# Patient Record
Sex: FEMALE | Race: WHITE | NOT HISPANIC OR LATINO | Employment: OTHER | URBAN - METROPOLITAN AREA
[De-identification: names, ages, dates, MRNs, and addresses within clinical notes are randomized per-mention and may not be internally consistent; named-entity substitution may affect disease eponyms.]

---

## 2017-11-24 ENCOUNTER — GENERIC CONVERSION - ENCOUNTER (OUTPATIENT)
Dept: FAMILY MEDICINE CLINIC | Facility: CLINIC | Age: 67
End: 2017-11-24

## 2017-12-07 ENCOUNTER — ALLSCRIPTS OFFICE VISIT (OUTPATIENT)
Dept: OTHER | Facility: OTHER | Age: 67
End: 2017-12-07

## 2017-12-07 ENCOUNTER — GENERIC CONVERSION - ENCOUNTER (OUTPATIENT)
Dept: OTHER | Facility: OTHER | Age: 67
End: 2017-12-07

## 2017-12-07 DIAGNOSIS — M85.851 OTHER SPECIFIED DISORDERS OF BONE DENSITY AND STRUCTURE, RIGHT THIGH: ICD-10-CM

## 2017-12-07 DIAGNOSIS — D62 ACUTE POSTHEMORRHAGIC ANEMIA: ICD-10-CM

## 2017-12-07 DIAGNOSIS — M85.852 OTHER SPECIFIED DISORDERS OF BONE DENSITY AND STRUCTURE, LEFT THIGH: ICD-10-CM

## 2017-12-08 ENCOUNTER — GENERIC CONVERSION - ENCOUNTER (OUTPATIENT)
Dept: OTHER | Facility: OTHER | Age: 67
End: 2017-12-08

## 2017-12-08 LAB
A/G RATIO (HISTORICAL): 1.8 (ref 1.2–2.2)
ALBUMIN SERPL BCP-MCNC: 4.1 G/DL (ref 3.6–4.8)
ALP SERPL-CCNC: 89 IU/L (ref 39–117)
ALT SERPL W P-5'-P-CCNC: 16 IU/L (ref 0–32)
AST SERPL W P-5'-P-CCNC: 20 IU/L (ref 0–40)
BASOPHILS # BLD AUTO: 0 X10E3/UL (ref 0–0.2)
BASOPHILS # BLD AUTO: 1 %
BILIRUB SERPL-MCNC: 0.2 MG/DL (ref 0–1.2)
BUN SERPL-MCNC: 15 MG/DL (ref 8–27)
BUN/CREA RATIO (HISTORICAL): 31 (ref 12–28)
CALCIUM SERPL-MCNC: 9.5 MG/DL (ref 8.7–10.3)
CHLORIDE SERPL-SCNC: 101 MMOL/L (ref 96–106)
CO2 SERPL-SCNC: 22 MMOL/L (ref 18–29)
CREAT SERPL-MCNC: 0.48 MG/DL (ref 0.57–1)
DEPRECATED RDW RBC AUTO: 14 % (ref 12.3–15.4)
EGFR AFRICAN AMERICAN (HISTORICAL): 116 ML/MIN/1.73
EGFR-AMERICAN CALC (HISTORICAL): 100 ML/MIN/1.73
EOSINOPHIL # BLD AUTO: 0.2 X10E3/UL (ref 0–0.4)
EOSINOPHIL # BLD AUTO: 5 %
GLUCOSE SERPL-MCNC: 87 MG/DL (ref 65–99)
HCT VFR BLD AUTO: 36.4 % (ref 34–46.6)
HGB BLD-MCNC: 12.2 G/DL (ref 11.1–15.9)
IMM.GRANULOCYTES (CD4/8) (HISTORICAL): 0 %
IMM.GRANULOCYTES (CD4/8) (HISTORICAL): 0 X10E3/UL (ref 0–0.1)
LYMPHOCYTES # BLD AUTO: 1.3 X10E3/UL (ref 0.7–3.1)
LYMPHOCYTES # BLD AUTO: 32 %
MCH RBC QN AUTO: 32.1 PG (ref 26.6–33)
MCHC RBC AUTO-ENTMCNC: 33.5 G/DL (ref 31.5–35.7)
MCV RBC AUTO: 96 FL (ref 79–97)
MONOCYTES # BLD AUTO: 0.4 X10E3/UL (ref 0.1–0.9)
MONOCYTES (HISTORICAL): 9 %
NEUTROPHILS # BLD AUTO: 2.1 X10E3/UL (ref 1.4–7)
NEUTROPHILS # BLD AUTO: 53 %
PLATELET # BLD AUTO: 280 X10E3/UL (ref 150–379)
POTASSIUM SERPL-SCNC: 4.4 MMOL/L (ref 3.5–5.2)
RBC (HISTORICAL): 3.8 X10E6/UL (ref 3.77–5.28)
SODIUM SERPL-SCNC: 140 MMOL/L (ref 134–144)
TOT. GLOBULIN, SERUM (HISTORICAL): 2.3 G/DL (ref 1.5–4.5)
TOTAL PROTEIN (HISTORICAL): 6.4 G/DL (ref 6–8.5)
WBC # BLD AUTO: 3.9 X10E3/UL (ref 3.4–10.8)

## 2018-01-23 NOTE — MISCELLANEOUS
Assessment    1  Intertrochanteric fracture of left hip (820 21) (S72 142A)   2  Breast cancer (174 9) (C50 629)   3  Osteopenia of both hips (733 90) (M85 851,M85 852)   4  Postoperative anemia due to acute blood loss (285 1) (D62)    Plan  Need for pneumococcal vaccine    · Prevnar 13 Intramuscular Suspension   For: Need for pneumococcal vaccine; Ordered By:Luan Vazquez; Effective Date:07Dec2017; Administered by: Susan Marie: 12/7/2017 12:53:00 PM; Last Updated By: Susan Marie; 12/7/2017 12:54:11 PM  Osteopenia of both hips    · Ibandronate Sodium 150 MG Oral Tablet (Boniva); TAKE ONE TABLET BY MOUTH  MONTHLY- take with 8 ounces water- do not eat for one hour   Rx By: Asya Zuniga; Dispense: 0 Days ; #:3 Tablet; Refill: 3; For: Osteopenia of both hips; MAGALY = N; Verified Transmission to [facility]; Last Updated By: System, [employer]; 12/7/2017 12:41:50 PM   · * DXA BONE DENSITY SPINE HIP AND PELVIS; Status:Hold For - Scheduling; Requested  for:07Dec2017;    Perform:[facility]; RTD:[id number]; Ordered;  For:Osteopenia of both hips; Ordered By:Luan Vazquez;  Postoperative anemia due to acute blood loss    · (1) CBC/PLT/DIFF; Status:Active; Requested for:07Dec2017;    Perform:[facility]; UBM:[id number]; Ordered; For:Postoperative anemia due to acute blood loss; Ordered By:Tyrel Vazquez;   · (1) COMPREHENSIVE METABOLIC PANEL; Status:Active; Requested for:07Dec2017;    Perform:[facility]; NAH:[id number]; Ordered; For:Postoperative anemia due to acute blood loss; Ordered By:Luan Vazquez;    Discussion/Summary  Discussion Summary:   In summary then this is a 64-year-old female here for transition of care visit  We dressed multiple issues today  She appears to be healing well from her ORIF with intramedullary velasquez for left intertrochanteric hip fracture  She has no fever, chest pain, shortness of breath, diarrhea, dysuria or other concerning symptom   She continues to follow home with PT OT and visiting nurse  She continues to take aspirin  She does have osteopenia and now is high risk for future fracture based on this fracture  We suggested starting Boniva getting a DEXA scan since she has not had 1 for nearly 2 years  Will also get CBC today as well as CMP due to her history of anemia and her recent hospitalization  She received a Prevnar today and she has received her influenza vaccine earlier this season  Will follow up with her when results of her blood work and DEXA scan are available  She agrees  We had a long discussion with her and her  and all questions were answered  Chief Complaint  Chief Complaint Free Text Note Form: Home OT/PT and VN  Had letrozole for her breast ca  Has ortho re eval 1/5/18  History of Present Illness  TCM Communication [facility]: The patient is being contacted for follow-up after hospitalization  Hospital records were reviewed  She was hospitalized at [facility]  The date of admission: 11/16/17, date of discharge: 11/24/17  Diagnosis: Fracture, intertrochanteric, left femur ORIF with intramedullary velasquez on 11/12  Significant postoperative anemia  She was discharged to home, with home health services  Medications reviewed and updated today  She scheduled a follow up appointment  Symptoms: no fever, no dizziness, no headache, no fatigue, no cough, no shortness of breath, no chest pain, no rash:, no anorexia, no nausea, no vomiting, no loose stools, no incisional pain and no wound drainage  Counseling was provided to the patient  Topics counseled included diagnostic results, instructions for management, risk factor reductions, prognosis, patient and family education, home health agency benefits, activities of daily living and importance of compliance with treatment     Communication performed and completed by Carlos Galindo   HPI: 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 who tripped over cord well leaf blowing on 11/11/2017  She suffered a left hip injury  She was hospitalized at [facility]  On 11/12 she underwent ORIF with intramedullary velasquez for a left intertrochanteric femur fracture  She was noted as significant blood loss and acute postoperative anemia  She also had a syncopal event while ambulating to the bathroom where she was unconscious for  2 minutes  She was resuscitated with fluids and responded  Vitamin-D was initiated  Was noted that she had had a history of osteopenia managed by Hematology/Oncology with a DEXA scan 2/16  She was transferred to [facility] on 11/16 and discharged to home on 11/24  She was discharged with home PT OT and visiting nursing  She is currently ambulating with a walker  She was treated with Lovenox and has been transition to aspirin  She has been using Percocet for pain management which has been significantly reduced  She is taking 2000 units of vitamin-D daily  Preop hemoglobin was 13 7 postoperatively she was 9 7  She has been taking 1 for sulfate tablet twice a day she is also taking Senokot  She has follow-up scheduled next week with      Active Problems    1  Allergic rhinitis (477 9) (J30 9)   2  Allergic rhinitis due to pollen (477 0) (J30 1)   3  Breast cancer (174 9) (C50 919)   4  Compression of sciatic nerve, 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)   5  Flu vaccine need (V04 81) (Z23)   6  Local reaction to insect sting, accidental or unintentional, initial encounter (989 5,E905 9)   (T63 481A)   7  Lymphedema (457 1) (I89 0)   8  Need for immunization against influenza (V04 81) (Z23)   9  Other fatigue (780 79) (R53 83)    Past Medical History    1  History of Acute Cystitis (595 0)    Social History    · Never A Smoker   · Smoker, current status unknown (305 1) (F17 200)    Current Meds   1  Acetaminophen 500 MG Oral Tablet; TAKE 1 TABLET EVERY 4 TO 6 HOURS AS   NEEDED;    Therapy: [id number] to (Evaluate:06Jan2018) Recorded   2  Aleve TABS; Therapy: (Recorded:92Nhq9376) to Recorded   3  Antivert 25 MG TABS; TAKE 1 TABLET 3 TIMES DAILY AS NEEDED; Therapy: (Recorded:25Nov2015) to Recorded   4  Aspirin 81 MG TABS; Therapy: (Recorded:39Fcc2524) to Recorded   5  Cetirizine HCl - 10 MG Oral Tablet; TAKE 1 TABLET DAILY AS NEEDED; Therapy: 57LGH7798 to (Evaluate:17Mar2016); Last Rx:10Mar2016 Ordered   6  Dicloxacillin Sodium 250 MG Oral Capsule; TAKE 1 CAPSULE Every 6 hours; Therapy: 56JIH2141 to (Evaluate:25Dec2016)  Requested for: 47LGZ7628; Last   Rx:15Dec2016 Ordered   7  Ferrous Sulfate 325 MG TBCR; Therapy: (Recorded:01Ixj4955) to Recorded   8  Loratadine 10 MG Oral Tablet; TAKE 1 TABLET DAILY; Therapy: 92YAF9546 to (Last Rx:05Oct2015) Ordered   9  Lovenox SOLN;   Therapy: (Recorded:34Yon2205) to Recorded   10  MiraLax POWD;    Therapy: (Recorded:07Rsx8077) to Recorded   11  OxyCODONE HCl - 10 MG Oral Tablet; Therapy: (Recorded:39Fkn0915) to Recorded   12  RA Tri-Buffered Aspirin 325 MG Oral Tablet; TAKE 1 TABLET DAILY; Therapy: 38TSZ0273 to (Evaluate:04Nov2015); Last Rx:05Oct2015 Ordered   13  Senna-Docusate Sodium 8 6-50 MG Oral Tablet; Therapy: 93GVO7758 to Recorded   14  Senokot TABS; Therapy: (Recorded:58Ros9041) to Recorded   15  Vitamin D3 1000 UNIT Oral Tablet; TAKE AS DIRECTED; Therapy: 22HVV0393 to Recorded   16  Vitamin D-3 CAPS; Therapy: (Recorded:49Nuk0304) to Recorded   17  Zanaflex 2 MG TABS; Therapy: (Recorded:19Ayd2388) to Recorded    Vitals  Signs   Recorded: 07Dec2017 11:41AM   Temperature: 64 8 F  Systolic: 298  Diastolic: 68  Weight: 028 lb   BMI Calculated: 26 39  BSA Calculated: 1 86    Physical Exam    Constitutional   General appearance: Abnormal   Mildly overweight and in no apparent distress  Eyes   Conjunctiva and lids: No swelling, erythema or discharge  Conjunctivae is mildly pale     Pulmonary   Respiratory effort: No increased work of breathing or signs of respiratory distress  Auscultation of lungs: Clear to auscultation  Lungs are clear throughout  Cardiovascular   Auscultation of heart: Normal rate and rhythm, normal S1 and S2, without murmurs  Cardiac rhythm is regular with a rate in the 70s and without murmur gallop  Examination of extremities for edema and/or varicosities: Normal   She has no edema, Ryan sign cord erythema X cetera  Carotid pulses: Normal   Normal carotid upstroke  Lymphatic   Palpation of lymph nodes in neck: No lymphadenopathy  No JVD or adenopathy  Musculoskeletal   Gait and station: Abnormal   Ambulating with a walker  Digits and nails: Normal without clubbing or cyanosis  No clubbing or cyanosis     Psychiatric   Orientation to person, place, and time: Normal     Mood and affect: Normal          Signatures   Electronically signed by : KRIS Plaza ; Dec  7 2017  1:52PM EST                       (Author)

## 2018-01-23 NOTE — RESULT NOTES
Verified Results  (1) COMPREHENSIVE METABOLIC PANEL [id number] 28:05MO Angela Carreno     Test Name Result Flag Reference   Glucose, Serum 87 mg/dL  65-99   BUN 15 mg/dL  8-27   Creatinine, Serum 0 48 mg/dL L 0 57-1 00   BUN/Creatinine Ratio 31 H 12-28   Sodium, Serum 140 mmol/L  134-144   Potassium, Serum 4 4 mmol/L  3 5-5 2   Chloride, Serum 101 mmol/L     Carbon Dioxide, Total 22 mmol/L  18-29   Calcium, Serum 9 5 mg/dL  8 7-10 3   Protein, Total, Serum 6 4 g/dL  6 0-8 5   Albumin, Serum 4 1 g/dL  3 6-4 8   Globulin, Total 2 3 g/dL  1 5-4 5   A/G Ratio 1 8  1 2-2 2   Bilirubin, Total 0 2 mg/dL  0 0-1 2   Alkaline Phosphatase, S 89 IU/L     AST (SGOT) 20 IU/L  0-40   ALT (SGPT) 16 IU/L  0-32   eGFR If NonAfricn Am 100 mL/min/1 73  >59   eGFR If Africn Am 116 mL/min/1 73  >59

## 2018-01-23 NOTE — RESULT NOTES
Verified Results  (1) CBC/PLT/DIFF [id number] 12:00AM Rickey Bonilla     Test Name Result Flag Reference   WBC 3 9 x10E3/uL  3 4-10 8   RBC 3 80 x10E6/uL  3 77-5 28   Hemoglobin 12 2 g/dL  11 1-15 9   **Please note reference interval change**   Hematocrit 36 4 %  34 0-46  6   MCV 96 fL  79-97   MCH 32 1 pg  26 6-33 0   MCHC 33 5 g/dL  31 5-35 7   RDW 14 0 %  12 3-15 4   Platelets 717 L66F5/AA  150-379   Neutrophils 53 %  Not Estab  Lymphs 32 %  Not Estab  Monocytes 9 %  Not Estab  Eos 5 %  Not Estab  Basos 1 %  Not Estab  Neutrophils (Absolute) 2 1 x10E3/uL  1 4-7 0   Lymphs (Absolute) 1 3 x10E3/uL  0 7-3 1   Monocytes(Absolute) 0 4 x10E3/uL  0 1-0 9   Eos (Absolute) 0 2 x10E3/uL  0 0-0 4   Baso (Absolute) 0 0 x10E3/uL  0 0-0 2   Immature Granulocytes 0 %  Not Estab     Immature Grans (Abs) 0 0 x10E3/uL  0 0-0 1

## 2018-01-24 VITALS
WEIGHT: 166 LBS | TEMPERATURE: 99.1 F | BODY MASS INDEX: 26.39 KG/M2 | SYSTOLIC BLOOD PRESSURE: 110 MMHG | DIASTOLIC BLOOD PRESSURE: 68 MMHG

## 2018-05-04 RX ORDER — LETROZOLE 2.5 MG/1
1 TABLET, FILM COATED ORAL DAILY
COMMUNITY
Start: 2015-10-05 | End: 2018-05-07

## 2018-05-04 RX ORDER — MECLIZINE HYDROCHLORIDE 25 MG/1
1 TABLET ORAL 3 TIMES DAILY PRN
COMMUNITY

## 2018-05-04 RX ORDER — TIZANIDINE 2 MG/1
TABLET ORAL
COMMUNITY
End: 2018-05-07

## 2018-05-04 RX ORDER — SENNA PLUS 8.6 MG/1
TABLET ORAL
COMMUNITY
End: 2018-05-07

## 2018-05-04 RX ORDER — IBANDRONATE SODIUM 150 MG/1
TABLET, FILM COATED ORAL
COMMUNITY
Start: 2017-12-07 | End: 2018-05-07 | Stop reason: CLARIF

## 2018-05-04 RX ORDER — BIOTIN 1 MG
TABLET ORAL
COMMUNITY
End: 2018-05-07

## 2018-05-04 RX ORDER — DICLOXACILLIN SODIUM 250 MG/1
1 CAPSULE ORAL EVERY 6 HOURS
COMMUNITY
Start: 2016-12-15 | End: 2018-05-07 | Stop reason: SDUPTHER

## 2018-05-04 RX ORDER — CETIRIZINE HYDROCHLORIDE 10 MG/1
1 TABLET ORAL DAILY PRN
COMMUNITY
Start: 2016-03-10 | End: 2018-05-07

## 2018-05-04 RX ORDER — FERROUS SULFATE 325(65) MG
TABLET ORAL
COMMUNITY
End: 2018-05-07

## 2018-05-04 RX ORDER — LORATADINE 10 MG/1
1 TABLET ORAL DAILY
COMMUNITY
Start: 2015-10-05 | End: 2018-05-07

## 2018-05-07 ENCOUNTER — OFFICE VISIT (OUTPATIENT)
Dept: FAMILY MEDICINE CLINIC | Facility: CLINIC | Age: 68
End: 2018-05-07
Payer: MEDICARE

## 2018-05-07 VITALS
BODY MASS INDEX: 27.97 KG/M2 | WEIGHT: 174 LBS | DIASTOLIC BLOOD PRESSURE: 82 MMHG | HEIGHT: 66 IN | SYSTOLIC BLOOD PRESSURE: 152 MMHG

## 2018-05-07 DIAGNOSIS — I89.0 LYMPHEDEMA: Primary | ICD-10-CM

## 2018-05-07 DIAGNOSIS — M85.852 OSTEOPENIA OF BOTH HIPS: Primary | ICD-10-CM

## 2018-05-07 DIAGNOSIS — M85.851 OSTEOPENIA OF BOTH HIPS: Primary | ICD-10-CM

## 2018-05-07 DIAGNOSIS — S72.142D CLOSED DISPLACED INTERTROCHANTERIC FRACTURE OF LEFT FEMUR WITH ROUTINE HEALING, SUBSEQUENT ENCOUNTER: ICD-10-CM

## 2018-05-07 PROBLEM — E55.9 VITAMIN D INSUFFICIENCY: Status: ACTIVE | Noted: 2017-11-01

## 2018-05-07 PROBLEM — C50.919 BREAST CANCER (HCC): Status: ACTIVE | Noted: 2017-11-09

## 2018-05-07 PROBLEM — S72.142A FRACTURE, INTERTROCHANTERIC, LEFT FEMUR (HCC): Status: ACTIVE | Noted: 2017-11-11

## 2018-05-07 PROBLEM — M17.10 ARTHRITIS OF KNEE: Status: ACTIVE | Noted: 2018-05-07

## 2018-05-07 PROBLEM — D62 POSTOPERATIVE ANEMIA DUE TO ACUTE BLOOD LOSS: Status: ACTIVE | Noted: 2017-12-07

## 2018-05-07 PROCEDURE — 99214 OFFICE O/P EST MOD 30 MIN: CPT | Performed by: FAMILY MEDICINE

## 2018-05-07 RX ORDER — DICLOXACILLIN SODIUM 250 MG/1
250 CAPSULE ORAL EVERY 6 HOURS
Qty: 40 CAPSULE | Refills: 0 | Status: SHIPPED | OUTPATIENT
Start: 2018-05-07 | End: 2018-05-17

## 2018-05-07 RX ORDER — NAPROXEN SODIUM 220 MG
220 TABLET ORAL AS NEEDED
COMMUNITY

## 2018-05-07 NOTE — PROGRESS NOTES
Assessment/Plan:  Osteopenia of both hips  The patient has low bone mineral density  She also has a history of a fragility fracture  She also has a history of breast carcinoma  She would certainly be a candidate for pharmacotherapy of her osteopenia with history of fracture  Unfortunately she currently needs significant oral surgery to include bone grafting which would preclude her being treated presently with pharmacotherapy  We discussed continued weight-bearing exercise as well as calcium plus vitamin-D  When cleared by her oral surgeon I would suggest beginning pharmacotherapy  We discussed her options  She is not in favor of injectable such as Prolia  We also reviewed that recent literature shows the [facility] may also not be the best option for her  We will most likely choose Actonel weekly for her  She will call us back when cleared by Oral surgery  We spent 30 minutes with the patient and her  of which 25 minutes were spent in discussion and counseling  Diagnoses and all orders for this visit:    Osteopenia of both hips    Closed displaced intertrochanteric fracture of left femur with routine healing, subsequent encounter    Other orders  -     meclizine (ANTIVERT) 25 mg tablet; Take 1 tablet by mouth 3 (three) times a day as needed  -     aspirin 81 MG tablet; Take by mouth  -     Discontinue: cetirizine (ZyrTEC) 10 mg tablet; Take 1 tablet by mouth daily as needed  -     Discontinue: dicloxacillin (DYNAPEN) 250 mg capsule; Take 1 capsule by mouth every 6 (six) hours  -     Discontinue: ferrous sulfate 325 (65 Fe) mg tablet; Take by mouth  -     Discontinue: ibandronate (BONIVA) 150 MG tablet; Take by mouth  -     Discontinue: letrozole (FEMARA) 2 5 mg tablet; Take 1 tablet by mouth daily  -     Discontinue: loratadine (CLARITIN) 10 mg tablet; Take 1 tablet by mouth daily  -     Discontinue: Cholecalciferol (VITAMIN D3) 1000 units CAPS;  Take by mouth  -     Discontinue: senna (SENOKOT) 8 6 MG tablet; Take by mouth  -     Discontinue: tiZANidine (ZANAFLEX) 2 mg tablet; Take by mouth  -     CALCIUM CITRATE-VITAMIN D3 PO; Take by mouth  -     naproxen sodium (ALEVE) 220 MG tablet; Take 220 mg by mouth every 24 hours          Subjective:   Chief Complaint   Patient presents with   [address]        Patient ID: Brenna Masters is a 77 y o  female  Actonel, Fosamax orProlia  HPI  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 diminished bone mineral density and possible pharmacotherapy  She has a history of hip fracture with ORIF and intramedullary velasquez  She was asked by Orthopedic surgery to avoid taking Boniva until hip had the chance to fully heal which was to be May of this year  In the meantime she has found out that she has significant dental issues and will need oral surgery to include bony grafting and implants  Oral surgeon has told her that she will not be able to take bisphosphonate for several months around the time of this treatment  She is here to discuss options for treatment of her low bone mineral density  She 1st requested that we review her DEXA scan from March 2nd 2018 through [address]  This DEXA scan revealed lumbar spine normal and-0 8  Total right hip -1 9  Right femoral neck-1 6  These findings are consistent with osteopenia  We did discuss that she had a fragility fracture of the hip and she does have a history of breast cancer making her high risk for further fracture  Pharmacotherapy would be indicated in her case  Current options recommended would include Actonel, Fosamax, Prolia or Reclast   She is compliant with weight-bearing exercise as well as calcium plus vitamin-D            The following portions of the patient's history were reviewed and updated as appropriate: allergies, current medications, past family history, past medical history, past social history, past surgical history and problem list     Review of Systems   Constitution: Negative  Cardiovascular: Negative  Respiratory: Negative  Endocrine:        History of breast cancer   Musculoskeletal: Positive for stiffness  Negative for back pain  History of hip fracture with intramedullary velasquez/ORIF       Per history of present illness  Objective:    Physical Exam   Constitutional: She is oriented to person, place, and time  She appears well-developed and well-nourished  No distress  Musculoskeletal:   Ambulates with a cane   Neurological: She is alert and oriented to person, place, and time  Psychiatric: She has a normal mood and affect   Thought content normal    Serous affect

## 2018-05-08 NOTE — ASSESSMENT & PLAN NOTE
The patient has low bone mineral density  She also has a history of a fragility fracture  She also has a history of breast carcinoma  She would certainly be a candidate for pharmacotherapy of her osteopenia with history of fracture  Unfortunately she currently needs significant oral surgery to include bone grafting which would preclude her being treated presently with pharmacotherapy  We discussed continued weight-bearing exercise as well as calcium plus vitamin-D  When cleared by her oral surgeon I would suggest beginning pharmacotherapy  We discussed her options  She is not in favor of injectable such as Prolia  We also reviewed that recent literature shows the [facility] may also not be the best option for her  We will most likely choose Actonel weekly for her  She will call us back when cleared by Oral surgery

## 2018-05-08 NOTE — PATIENT INSTRUCTIONS
Please call when you cleared by Oral surgery for treatment of your low bone mineral density  Call sooner as needed

## 2018-10-08 ENCOUNTER — OFFICE VISIT (OUTPATIENT)
Dept: FAMILY MEDICINE CLINIC | Facility: CLINIC | Age: 68
End: 2018-10-08

## 2018-10-08 VITALS
HEIGHT: 66 IN | HEART RATE: 75 BPM | WEIGHT: 173 LBS | BODY MASS INDEX: 27.8 KG/M2 | SYSTOLIC BLOOD PRESSURE: 124 MMHG | OXYGEN SATURATION: 96 % | DIASTOLIC BLOOD PRESSURE: 80 MMHG | TEMPERATURE: 98.3 F

## 2018-10-08 DIAGNOSIS — F41.9 ACUTE ANXIETY: Primary | ICD-10-CM

## 2018-10-08 DIAGNOSIS — Z23 NEED FOR INFLUENZA VACCINATION: ICD-10-CM

## 2018-10-08 PROCEDURE — 99213 OFFICE O/P EST LOW 20 MIN: CPT | Performed by: FAMILY MEDICINE

## 2018-10-08 PROCEDURE — 90662 IIV NO PRSV INCREASED AG IM: CPT

## 2018-10-08 PROCEDURE — G0008 ADMIN INFLUENZA VIRUS VAC: HCPCS

## 2018-10-08 RX ORDER — FAMOTIDINE 20 MG/1
20 TABLET, FILM COATED ORAL DAILY
COMMUNITY
End: 2019-01-11

## 2018-10-08 RX ORDER — ALPRAZOLAM 0.25 MG/1
0.25 TABLET ORAL
Qty: 30 TABLET | Refills: 1 | Status: SHIPPED | OUTPATIENT
Start: 2018-10-08 | End: 2018-11-28 | Stop reason: SDUPTHER

## 2018-10-08 RX ORDER — ERGOCALCIFEROL (VITAMIN D2) 50 MCG
CAPSULE ORAL DAILY
COMMUNITY

## 2018-10-08 NOTE — ASSESSMENT & PLAN NOTE
Patient is a 75-year-old female who presents today with an acute anxiety disorder related to her 's recent diagnosis of metastatic prostate carcinoma  She has no suicidal ideation  We are going to give her Xanax 0 25 number 30 to be used as needed  We discussed this at length  We also gave her warnings as to sedation, ataxia, driving operating machinery X cetera  She is in agreement with the above  She will be accompanying her  to his visits here and we will discuss this as needed at that time  She will call sooner as needed  She agrees

## 2018-10-08 NOTE — PROGRESS NOTES
Assessment/Plan:  Acute anxiety  Patient is a 47-year-old female who presents today with an acute anxiety disorder related to her 's recent diagnosis of metastatic prostate carcinoma  She has no suicidal ideation  We are going to give her Xanax 0 25 number 30 to be used as needed  We discussed this at length  We also gave her warnings as to sedation, ataxia, driving operating machinery X cetera  She is in agreement with the above  She will be accompanying her  to his visits here and we will discuss this as needed at that time  She will call sooner as needed  She agrees  She is going to return for fasting blood work to include CBC CMP and TSH  Diagnoses and all orders for this visit:    Acute anxiety  -     ALPRAZolam (XANAX) 0 25 mg tablet; Take 1 tablet (0 25 mg total) by mouth daily at bedtime as needed for anxiety    Need for influenza vaccination  -     influenza vaccine, 2808-9917, high-dose, PF 0 5 mL, for patients 65 yr+ (FLUZONE HIGH-DOSE)    Other orders  -     Vitamin D, Ergocalciferol, 2000 units CAPS; Take by mouth daily  -     famotidine (PEPCID) 20 mg tablet; Take 20 mg by mouth daily          Subjective:   Chief Complaint   Patient presents with    Anxiety     pt here for discuss her anxiety today  she will get a flu shot  last colon was 11/12  she will schedule for fbw  Patient ID: Leena Leung is a 77 y o  female  I have stomach aches from my anxiety  Excessive worry, insomnia  Pepcid somewhat helpful  Similar feelings in 25s during marital stress in 25s  No suicidal ideation  HPI  The patient is a 47-year-old female who presents today with anxiety and related somatic symptom  Her  was recently diagnosed with metastatic prostate carcinoma and she has acute anxiety related to this  She has had issues with insomnia and excessive worry  She has had some GI complaint with epigastric discomfort and acid indigestion    She has had no dysphagia no change in her bowels  She has had no suicidal ideation  She did have a similar episode in her 25s do to marital dysfunction but has had no recurrence  The following portions of the patient's history were reviewed and updated as appropriate: allergies, current medications, past family history, past medical history, past social history, past surgical history and problem list     ROS    Limited review of systems as per HPI  Objective:    Physical Exam   Constitutional: She is oriented to person, place, and time  She appears well-developed and well-nourished  No distress  Neck: Neck supple  No JVD present  No thyromegaly present  Cardiovascular: Normal rate and regular rhythm  Pulmonary/Chest: Effort normal and breath sounds normal    Neurological: She is alert and oriented to person, place, and time  Psychiatric: She has a normal mood and affect     Anxious appearance

## 2018-11-28 DIAGNOSIS — F41.9 ACUTE ANXIETY: ICD-10-CM

## 2018-11-29 RX ORDER — ALPRAZOLAM 0.25 MG/1
0.25 TABLET ORAL 2 TIMES DAILY PRN
Qty: 60 TABLET | Refills: 0 | OUTPATIENT
Start: 2018-11-29 | End: 2019-01-03 | Stop reason: SDUPTHER

## 2019-01-03 DIAGNOSIS — F41.9 ACUTE ANXIETY: ICD-10-CM

## 2019-01-03 RX ORDER — ALPRAZOLAM 0.25 MG/1
0.25 TABLET ORAL 2 TIMES DAILY PRN
Qty: 60 TABLET | Refills: 0 | OUTPATIENT
Start: 2019-01-03 | End: 2019-02-08 | Stop reason: SDUPTHER

## 2019-01-11 ENCOUNTER — OFFICE VISIT (OUTPATIENT)
Dept: FAMILY MEDICINE CLINIC | Facility: CLINIC | Age: 69
End: 2019-01-11
Payer: MEDICARE

## 2019-01-11 VITALS
SYSTOLIC BLOOD PRESSURE: 142 MMHG | HEIGHT: 66 IN | BODY MASS INDEX: 27.32 KG/M2 | DIASTOLIC BLOOD PRESSURE: 80 MMHG | WEIGHT: 170 LBS

## 2019-01-11 DIAGNOSIS — F43.21 ADJUSTMENT DISORDER WITH DEPRESSED MOOD: Primary | ICD-10-CM

## 2019-01-11 PROCEDURE — 99214 OFFICE O/P EST MOD 30 MIN: CPT | Performed by: FAMILY MEDICINE

## 2019-01-11 RX ORDER — FLUOXETINE 10 MG/1
10 TABLET, FILM COATED ORAL DAILY
Qty: 30 TABLET | Refills: 0 | Status: SHIPPED | OUTPATIENT
Start: 2019-01-11 | End: 2019-02-08 | Stop reason: SDUPTHER

## 2019-01-11 RX ORDER — NAPROXEN SODIUM 220 MG
220 TABLET ORAL
COMMUNITY
End: 2019-01-11

## 2019-01-11 RX ORDER — OXYCODONE HYDROCHLORIDE 10 MG/1
10-15 TABLET ORAL EVERY 4 HOURS PRN
COMMUNITY
Start: 2017-11-22 | End: 2019-01-11

## 2019-01-11 RX ORDER — ACETAMINOPHEN 500 MG
1000 TABLET ORAL EVERY 8 HOURS PRN
COMMUNITY
Start: 2017-11-22 | End: 2019-01-11

## 2019-01-11 NOTE — ASSESSMENT & PLAN NOTE
The patient is suffering from adjustment disorder related to her 's illness with exacerbation of her underlying anxiety depression  We are going to start her on fluoxetine 10 mg daily  We had a long discussion of this  We told her that she may not see improvement in her condition for 3 days to 3 weeks  She may experience nausea lightheadedness, sleep disturbance or even suicidal thinking  She is going to let us know should she have any significant side effect  Will see her back in 3 weeks or she will call sooner as needed  She will continue to use alprazolam as needed  She does have an appointment to see a psychiatrist in March for evaluation

## 2019-01-11 NOTE — PROGRESS NOTES
Assessment/Plan:  Adjustment disorder with depressed mood  The patient is suffering from adjustment disorder related to her 's illness with exacerbation of her underlying anxiety depression  We are going to start her on fluoxetine 10 mg daily  We had a long discussion of this  We told her that she may not see improvement in her condition for 3 days to 3 weeks  She may experience nausea lightheadedness, sleep disturbance or even suicidal thinking  She is going to let us know should she have any significant side effect  Will see her back in 3 weeks or she will call sooner as needed  She will continue to use alprazolam as needed  She does have an appointment to see a psychiatrist in March for evaluation  Diagnoses and all orders for this visit:    Adjustment disorder with depressed mood  -     FLUoxetine (PROzac) 10 MG tablet; Take 1 tablet (10 mg total) by mouth daily    Other orders  -     Discontinue: acetaminophen (TYLENOL) 500 mg tablet; Take 1,000 mg by mouth every 8 (eight) hours as needed  -     Discontinue: oxyCODONE (ROXICODONE) 10 MG TABS; Take 10-15 mg by mouth every 4 (four) hours as needed  -     Discontinue: naproxen sodium (ALEVE) 220 MG tablet; Take 220 mg by mouth          Subjective:   Chief Complaint   Patient presents with    Anxiety     Weepiness and loss of interest     Patient ID: Donald Bernard is a 77 y o  female  HPI  Patient is a 33-year-old female who presents today for follow-up of anxiety  She feels that she also suffers from depression which she feels has been an issue for many years  Many of her family members take medications for anxiety and depression  She felt like she can handle it on her own until her 's recent diagnosis of prostate cancer, chemotherapy X cetera  She states that she has found herself weeping  She has loss of interest and energy  She has feelings of guilt and continues with insomnia  She has difficulty with concentration  She is not suicidal   Her daughter takes sertraline  The following portions of the patient's history were reviewed and updated as appropriate: allergies, current medications, past family history, past medical history, past social history, past surgical history and problem list     Review of Systems   Constitution: Negative for decreased appetite  Cardiovascular: Negative for chest pain  Respiratory: Negative for shortness of breath  Neurological: Negative for headaches  Psychiatric/Behavioral: Positive for depression  Negative for hallucinations, hypervigilance, memory loss, substance abuse and suicidal ideas  The patient has insomnia and is nervous/anxious  Objective:    Physical Exam   Constitutional: She is oriented to person, place, and time  She appears well-developed and well-nourished  Neck: No thyromegaly present  Cardiovascular: Normal rate and regular rhythm  Pulmonary/Chest: Effort normal and breath sounds normal    Neurological: She is alert and oriented to person, place, and time  Psychiatric: Thought content normal    Depressed affect   Nursing note and vitals reviewed

## 2019-02-08 ENCOUNTER — OFFICE VISIT (OUTPATIENT)
Dept: FAMILY MEDICINE CLINIC | Facility: CLINIC | Age: 69
End: 2019-02-08
Payer: COMMERCIAL

## 2019-02-08 VITALS
TEMPERATURE: 98.3 F | WEIGHT: 169 LBS | HEART RATE: 69 BPM | OXYGEN SATURATION: 98 % | BODY MASS INDEX: 27.16 KG/M2 | SYSTOLIC BLOOD PRESSURE: 130 MMHG | DIASTOLIC BLOOD PRESSURE: 80 MMHG | HEIGHT: 66 IN

## 2019-02-08 DIAGNOSIS — Z13.220 SCREENING, LIPID: ICD-10-CM

## 2019-02-08 DIAGNOSIS — F41.9 ACUTE ANXIETY: ICD-10-CM

## 2019-02-08 DIAGNOSIS — M85.852 OSTEOPENIA OF BOTH HIPS: ICD-10-CM

## 2019-02-08 DIAGNOSIS — M85.851 OSTEOPENIA OF BOTH HIPS: ICD-10-CM

## 2019-02-08 DIAGNOSIS — Z11.59 NEED FOR HEPATITIS C SCREENING TEST: Primary | ICD-10-CM

## 2019-02-08 DIAGNOSIS — F43.21 ADJUSTMENT DISORDER WITH DEPRESSED MOOD: ICD-10-CM

## 2019-02-08 DIAGNOSIS — Z13.0 SCREENING FOR IRON DEFICIENCY ANEMIA: ICD-10-CM

## 2019-02-08 DIAGNOSIS — Z13.1 SCREENING FOR DIABETES MELLITUS: ICD-10-CM

## 2019-02-08 PROBLEM — D62 POSTOPERATIVE ANEMIA DUE TO ACUTE BLOOD LOSS: Status: RESOLVED | Noted: 2017-12-07 | Resolved: 2019-02-08

## 2019-02-08 PROCEDURE — 99214 OFFICE O/P EST MOD 30 MIN: CPT | Performed by: FAMILY MEDICINE

## 2019-02-08 PROCEDURE — 1160F RVW MEDS BY RX/DR IN RCRD: CPT | Performed by: FAMILY MEDICINE

## 2019-02-08 PROCEDURE — 3008F BODY MASS INDEX DOCD: CPT | Performed by: FAMILY MEDICINE

## 2019-02-08 PROCEDURE — 1036F TOBACCO NON-USER: CPT | Performed by: FAMILY MEDICINE

## 2019-02-08 RX ORDER — ALPRAZOLAM 0.25 MG/1
0.25 TABLET ORAL 2 TIMES DAILY PRN
Qty: 60 TABLET | Refills: 0 | Status: CANCELLED | OUTPATIENT
Start: 2019-02-08

## 2019-02-08 RX ORDER — FLUOXETINE 20 MG/1
20 TABLET, FILM COATED ORAL DAILY
Qty: 30 TABLET | Refills: 1 | Status: SHIPPED | OUTPATIENT
Start: 2019-02-08 | End: 2020-04-23 | Stop reason: SINTOL

## 2019-02-08 RX ORDER — ALPRAZOLAM 0.25 MG/1
0.25 TABLET ORAL 3 TIMES DAILY PRN
Qty: 90 TABLET | Refills: 1 | Status: SHIPPED | OUTPATIENT
Start: 2019-02-08

## 2019-02-08 NOTE — ASSESSMENT & PLAN NOTE
We are going to increase her dose of alprazolam so that she can take 2 at bedtime to hopefully improve her insomnia  Additionally hopefully increase in fluoxetine will aid with this as well

## 2019-02-08 NOTE — ASSESSMENT & PLAN NOTE
We are going to have her increase her fluoxetine to 20 mg  She will be seeing Psychiatry in 2-3 weeks  Most likely they will be assuming her care  She is asked to call back in the meantime should she develop any suicidal thoughts or progression of her symptoms

## 2019-02-08 NOTE — PROGRESS NOTES
Assessment/Plan:  Adjustment disorder with depressed mood  We are going to have her increase her fluoxetine to 20 mg  She will be seeing Psychiatry in 2-3 weeks  Most likely they will be assuming her care  She is asked to call back in the meantime should she develop any suicidal thoughts or progression of her symptoms  Acute anxiety  We are going to increase her dose of alprazolam so that she can take 2 at bedtime to hopefully improve her insomnia  Additionally hopefully increase in fluoxetine will aid with this as well  Osteopenia of both hips  We are going to discuss institution of bisphosphonate with her in the future  Diagnoses and all orders for this visit:    Need for hepatitis C screening test  -     Hepatitis C antibody; Future    Acute anxiety  -     ALPRAZolam (XANAX) 0 25 mg tablet; Take 1 tablet (0 25 mg total) by mouth 3 (three) times a day as needed for anxiety  -     TSH, 3rd generation with Free T4 reflex; Future  -     TSH, 3rd generation with Free T4 reflex    Screening for iron deficiency anemia  -     CBC; Future    Screening for diabetes mellitus  -     Comprehensive metabolic panel; Future    Screening, lipid  -     Lipid panel; Future    Adjustment disorder with depressed mood  -     FLUoxetine (PROzac) 20 MG tablet; Take 1 tablet (20 mg total) by mouth daily    Osteopenia of both hips    Other orders  -     Cancel: ALPRAZolam (XANAX) 0 25 mg tablet; Take 1 tablet (0 25 mg total) by mouth 2 (two) times a day as needed for anxiety      we are going to follow up with her by phone when results of her blood work results are available  Subjective:   Chief Complaint   Patient presents with    Follow-up     4wk recheck on her meds  pt will go for fbw next week, and i will her the orders today         Patient ID: Lori Jeong is a 77 y o  female      HPI  The patient is a 39-year-old female who presents today for follow-up of anxiety and depressed mood related to adjustment disorder  Her  is being treated with chemotherapy presently for prostate carcinoma  He has been having some emotional difficulty with this  This is adding to her anxiety level  She states that she has taken the Prozac for 4 weeks and does not yet feel great  She states she feels down in the dumps anxious and nervous  She takes 1 Xanax at night to help her sleep and half during the daytime  She frequently awakens after 2 hours and has to take a Benadryl to get back to sleep  Overall she has no side effects from the Xanax for the Prozac  She does continue have vegetative symptoms of depression including difficulty with sleep, diminished interest and energy level  She is not suicidal   She states that she would like to discuss treatment with Fosamax for her osteopenia and history of femur fracture  She would like to wait until she is stabilized on the fluoxetine and alprazolam   She is going to be seeing a psychiatrist in 2 weeks for further evaluation  The following portions of the patient's history were reviewed and updated as appropriate: allergies, current medications, past family history, past medical history, past social history, past surgical history and problem list     Review of Systems   Constitution: Positive for malaise/fatigue  Negative for decreased appetite and weight gain  Respiratory: Negative for cough and shortness of breath  Endocrine: Negative for polydipsia, polyphagia and polyuria  Hematologic/Lymphatic: Negative for adenopathy and bleeding problem  Does not bruise/bleed easily  Musculoskeletal: Negative for myalgias  Neurological: Negative for dizziness and headaches  Psychiatric/Behavioral: Positive for depression  Negative for altered mental status and suicidal ideas  The patient has insomnia and is nervous/anxious  Objective:    Physical Exam   Constitutional: She is oriented to person, place, and time  She appears well-developed and well-nourished  Neck: Neck supple  No JVD present  No thyromegaly present  Cardiovascular: Normal rate, regular rhythm and normal heart sounds  Pulmonary/Chest: Effort normal and breath sounds normal  No respiratory distress  She has no wheezes  She has no rales  Lymphadenopathy:     She has no cervical adenopathy  Neurological: She is alert and oriented to person, place, and time  Skin: No rash noted  No erythema     Psychiatric:   Mildly dysphoric affect

## 2019-02-14 ENCOUNTER — CLINICAL SUPPORT (OUTPATIENT)
Dept: FAMILY MEDICINE CLINIC | Facility: CLINIC | Age: 69
End: 2019-02-14
Payer: COMMERCIAL

## 2019-02-14 DIAGNOSIS — Z11.59 NEED FOR HEPATITIS C SCREENING TEST: Primary | ICD-10-CM

## 2019-02-14 DIAGNOSIS — Z13.0 SCREENING FOR IRON DEFICIENCY ANEMIA: ICD-10-CM

## 2019-02-14 DIAGNOSIS — Z13.220 SCREENING, LIPID: ICD-10-CM

## 2019-02-14 PROCEDURE — 36415 COLL VENOUS BLD VENIPUNCTURE: CPT

## 2019-02-15 LAB
CHOLEST SERPL-MCNC: 186 MG/DL
CHOLEST/HDLC SERPL: 2.1 (CALC)
ERYTHROCYTE [DISTWIDTH] IN BLOOD BY AUTOMATED COUNT: 13 % (ref 11–15)
HCT VFR BLD AUTO: 46.1 % (ref 35–45)
HCV AB S/CO SERPL IA: 0.01
HCV AB SERPL QL IA: NORMAL
HDLC SERPL-MCNC: 87 MG/DL
HGB BLD-MCNC: 15.1 G/DL (ref 11.7–15.5)
LDLC SERPL CALC-MCNC: 86 MG/DL (CALC)
MCH RBC QN AUTO: 32.3 PG (ref 27–33)
MCHC RBC AUTO-ENTMCNC: 32.8 G/DL (ref 32–36)
MCV RBC AUTO: 98.7 FL (ref 80–100)
NONHDLC SERPL-MCNC: 99 MG/DL (CALC)
PLATELET # BLD AUTO: 213 THOUSAND/UL (ref 140–400)
PMV BLD REES-ECKER: 9.7 FL (ref 7.5–12.5)
RBC # BLD AUTO: 4.67 MILLION/UL (ref 3.8–5.1)
TRIGL SERPL-MCNC: 52 MG/DL
WBC # BLD AUTO: 3.5 THOUSAND/UL (ref 3.8–10.8)

## 2019-10-14 ENCOUNTER — IMMUNIZATIONS (OUTPATIENT)
Dept: FAMILY MEDICINE CLINIC | Facility: CLINIC | Age: 69
End: 2019-10-14
Payer: COMMERCIAL

## 2019-10-14 DIAGNOSIS — Z23 ENCOUNTER FOR IMMUNIZATION: ICD-10-CM

## 2019-10-14 PROCEDURE — 90662 IIV NO PRSV INCREASED AG IM: CPT

## 2019-10-14 PROCEDURE — G0008 ADMIN INFLUENZA VIRUS VAC: HCPCS

## 2019-12-13 ENCOUNTER — OFFICE VISIT (OUTPATIENT)
Dept: FAMILY MEDICINE CLINIC | Facility: CLINIC | Age: 69
End: 2019-12-13
Payer: COMMERCIAL

## 2019-12-13 VITALS
HEIGHT: 66 IN | BODY MASS INDEX: 28.45 KG/M2 | WEIGHT: 177 LBS | DIASTOLIC BLOOD PRESSURE: 78 MMHG | SYSTOLIC BLOOD PRESSURE: 138 MMHG

## 2019-12-13 DIAGNOSIS — I44.7 LEFT BUNDLE BRANCH BLOCK: ICD-10-CM

## 2019-12-13 DIAGNOSIS — M17.11 PRIMARY OSTEOARTHRITIS OF RIGHT KNEE: Primary | ICD-10-CM

## 2019-12-13 PROBLEM — M17.12 PRIMARY OSTEOARTHRITIS OF LEFT KNEE: Status: ACTIVE | Noted: 2018-05-07

## 2019-12-13 PROCEDURE — 1036F TOBACCO NON-USER: CPT | Performed by: FAMILY MEDICINE

## 2019-12-13 PROCEDURE — 3008F BODY MASS INDEX DOCD: CPT | Performed by: FAMILY MEDICINE

## 2019-12-13 PROCEDURE — 1160F RVW MEDS BY RX/DR IN RCRD: CPT | Performed by: FAMILY MEDICINE

## 2019-12-13 PROCEDURE — 99214 OFFICE O/P EST MOD 30 MIN: CPT | Performed by: FAMILY MEDICINE

## 2019-12-13 NOTE — PROGRESS NOTES
BMI Counseling: Body mass index is 29 01 kg/m²  The BMI is above normal  Nutrition recommendations include decreasing portion sizes, encouraging healthy choices of fruits and vegetables and moderation in carbohydrate intake  Exercise recommendations include moderate physical activity 150 minutes/week and exercising 3-5 times per week  No pharmacotherapy was ordered  Assessment/Plan:  Primary osteoarthritis of right knee  The patient presents today for preoperative clearance for right total knee replacement by Dr Nadia Keith at [facility] in early January  Preoperative blood work was essentially normal   Preoperative ECG showed a left bundle branch block which has been present for at least 2 years  She is asymptomatic from a cardiopulmonary standpoint  She has no cardiac functional limitation presently  She does not have a cardiac history otherwise  She is scheduled to see [facility] for further evaluation  Due to her unlimited cardiac functional status as well as notation that this left bundle-branch block is not new I believe she is appropriate for surgery at present  She is cleared for the proposed procedure  Left bundle branch block  Previously noted  No history of workup  No present cardiac functional limitation  She is cleared for the proposed procedure  She does have evaluation with [facility] scheduled  Diagnoses and all orders for this visit:    Primary osteoarthritis of right knee    Left bundle branch block    Other orders  -     Ascorbic Acid (VITAMIN C PO); Take 500 mg by mouth  -     Discontinue: Calcium-Magnesium-Vitamin D (CITRACAL CALCIUM+D PO); Take 1 capsule by mouth  -     mupirocin (BACTROBAN) 2 % ointment; Apply topically 2 (two) times a day          Subjective:   Chief Complaint   Patient presents with    Surgical clearance right total knee        Patient ID: Claudy Bill is a 68 y o  female  L BBB  No Cp   No cardiac functional limitation  No VTE by history  No liver disease, lung disease  No anticoagulants other than baby ASA  Xanax HS  D/c'ed fluoxetine  No GI /  sx  R TKR  HPI  The patient is a 66-year-old female who presents today for a preoperative clearance for right total knee replacement  This is to be performed by Dr Kei Lopez at [facility]  She had preoperative blood work performed which essentially is normal   She had preoperative ECG which showed a left bundle branch block  She did consult with anesthesia who told her that this is unchanged from a previous ECG performed a couple of years ago for urgent surgery related to left femur fracture repair  She was unaware of this left bundle branch block  She has no cardiac history  She denies any symptomatology or cardiac functional limitation such as inability to climb 2 flights of stairs or walk several blocks on a flat surface without symptoms  She states that her only limitation is related to the degenerative joint disease in her knees  She has no history of venous thromboembolic disorder  She has no history of bleeding  She is taking baby aspirin  She takes Xanax HS for chronic anxiety  By review she denies any GI or  symptoms  She has no history of liver or lung disease  She has no history of issues with anesthesia  Past surgical history includes inter trochanteric femur fracture on the left with ORIF  Breast cancer surgery 2006  The following portions of the patient's history were reviewed and updated as appropriate: allergies, current medications, past family history, past medical history, past social history, past surgical history and problem list     Review of Systems   Constitution: Negative  Cardiovascular: Negative  Respiratory: Negative  Endocrine: Negative  Musculoskeletal: Positive for arthritis, joint pain, joint swelling and stiffness  Negative for falls and muscle weakness     Gastrointestinal: Negative for constipation and diarrhea  Genitourinary: Negative for bladder incontinence, dysuria and frequency  Neurological: Negative for dizziness and headaches  Psychiatric/Behavioral: Negative for depression and suicidal ideas  The patient has insomnia and is nervous/anxious  Objective:    Physical Exam   Constitutional: She is oriented to person, place, and time  She appears well-developed and well-nourished  HENT:   Right Ear: External ear normal    Left Ear: External ear normal    Mouth/Throat: Oropharynx is clear and moist    Eyes: Pupils are equal, round, and reactive to light  Conjunctivae are normal    Neck: Neck supple  No JVD present  No thyromegaly present  Cardiovascular: Normal rate, regular rhythm and normal heart sounds  Exam reveals no gallop  No murmur heard  Pulmonary/Chest: Breath sounds normal  No respiratory distress  She has no wheezes  She has no rales  Abdominal: Soft  Bowel sounds are normal  She exhibits no mass  There is no tenderness  Musculoskeletal: She exhibits no edema  Knee exam per Orthopedic surgery   Lymphadenopathy:     She has no cervical adenopathy  Neurological: She is alert and oriented to person, place, and time  Skin: No rash noted  Nursing note and vitals reviewed

## 2019-12-13 NOTE — ASSESSMENT & PLAN NOTE
The patient presents today for preoperative clearance for right total knee replacement by Dr Beatris Street at [facility] in early January  Preoperative blood work was essentially normal   Preoperative ECG showed a left bundle branch block which has been present for at least 2 years  She is asymptomatic from a cardiopulmonary standpoint  She has no cardiac functional limitation presently  She does not have a cardiac history otherwise  She is scheduled to see [facility] for further evaluation  Due to her unlimited cardiac functional status as well as notation that this left bundle-branch block is not new I believe she is appropriate for surgery at present  She is cleared for the proposed procedure

## 2019-12-13 NOTE — ASSESSMENT & PLAN NOTE
Previously noted  No history of workup  No present cardiac functional limitation  She is cleared for the proposed procedure  She does have evaluation with [facility] scheduled

## 2020-04-13 ENCOUNTER — TELEMEDICINE (OUTPATIENT)
Dept: FAMILY MEDICINE CLINIC | Facility: CLINIC | Age: 70
End: 2020-04-13
Payer: COMMERCIAL

## 2020-04-13 VITALS — WEIGHT: 171 LBS | BODY MASS INDEX: 27.48 KG/M2 | HEIGHT: 66 IN

## 2020-04-13 DIAGNOSIS — N30.00 ACUTE CYSTITIS WITHOUT HEMATURIA: Primary | ICD-10-CM

## 2020-04-13 DIAGNOSIS — I42.0 CARDIOMYOPATHY, DILATED (HCC): ICD-10-CM

## 2020-04-13 PROBLEM — T45.1X5A CHEMOTHERAPY INDUCED CARDIOMYOPATHY (HCC): Status: ACTIVE | Noted: 2020-03-03

## 2020-04-13 PROBLEM — I44.7 LEFT BUNDLE BRANCH BLOCK (LBBB): Status: ACTIVE | Noted: 2020-02-21

## 2020-04-13 PROBLEM — I42.7 CHEMOTHERAPY INDUCED CARDIOMYOPATHY (HCC): Status: ACTIVE | Noted: 2020-03-03

## 2020-04-13 PROCEDURE — 3008F BODY MASS INDEX DOCD: CPT | Performed by: FAMILY MEDICINE

## 2020-04-13 PROCEDURE — 99214 OFFICE O/P EST MOD 30 MIN: CPT | Performed by: FAMILY MEDICINE

## 2020-04-13 RX ORDER — CARVEDILOL 3.12 MG/1
3.12 TABLET ORAL
COMMUNITY
Start: 2020-03-25 | End: 2020-04-23 | Stop reason: DRUGHIGH

## 2020-04-13 RX ORDER — LORATADINE 10 MG/1
10 TABLET ORAL DAILY
COMMUNITY

## 2020-04-13 RX ORDER — PHENAZOPYRIDINE HYDROCHLORIDE 200 MG/1
200 TABLET, FILM COATED ORAL
Qty: 10 TABLET | Refills: 0 | Status: SHIPPED | OUTPATIENT
Start: 2020-04-13 | End: 2020-04-18 | Stop reason: SDUPTHER

## 2020-04-13 RX ORDER — LANOLIN ALCOHOL/MO/W.PET/CERES
3 CREAM (GRAM) TOPICAL
COMMUNITY
Start: 2020-03-31 | End: 2021-03-31

## 2020-04-13 RX ORDER — CIPROFLOXACIN 250 MG/1
250 TABLET, FILM COATED ORAL EVERY 12 HOURS SCHEDULED
Qty: 10 TABLET | Refills: 0 | Status: SHIPPED | OUTPATIENT
Start: 2020-04-13 | End: 2020-04-18

## 2020-04-18 ENCOUNTER — NURSE TRIAGE (OUTPATIENT)
Dept: OTHER | Facility: OTHER | Age: 70
End: 2020-04-18

## 2020-04-18 DIAGNOSIS — N30.00 ACUTE CYSTITIS WITHOUT HEMATURIA: Primary | ICD-10-CM

## 2020-04-18 PROCEDURE — G2012 BRIEF CHECK IN BY MD/QHP: HCPCS | Performed by: FAMILY MEDICINE

## 2020-04-18 RX ORDER — SULFAMETHOXAZOLE AND TRIMETHOPRIM 800; 160 MG/1; MG/1
1 TABLET ORAL EVERY 12 HOURS SCHEDULED
Qty: 14 TABLET | Refills: 0 | Status: SHIPPED | OUTPATIENT
Start: 2020-04-18 | End: 2020-04-23

## 2020-04-18 RX ORDER — PHENAZOPYRIDINE HYDROCHLORIDE 200 MG/1
200 TABLET, FILM COATED ORAL
Qty: 10 TABLET | Refills: 0 | Status: SHIPPED | OUTPATIENT
Start: 2020-04-18 | End: 2020-04-22 | Stop reason: SDUPTHER

## 2020-04-22 DIAGNOSIS — N30.00 ACUTE CYSTITIS WITHOUT HEMATURIA: ICD-10-CM

## 2020-04-23 ENCOUNTER — TELEMEDICINE (OUTPATIENT)
Dept: FAMILY MEDICINE CLINIC | Facility: CLINIC | Age: 70
End: 2020-04-23
Payer: COMMERCIAL

## 2020-04-23 VITALS
BODY MASS INDEX: 28.02 KG/M2 | DIASTOLIC BLOOD PRESSURE: 70 MMHG | TEMPERATURE: 97 F | WEIGHT: 171 LBS | SYSTOLIC BLOOD PRESSURE: 130 MMHG | HEART RATE: 68 BPM

## 2020-04-23 DIAGNOSIS — N30.00 ACUTE CYSTITIS WITHOUT HEMATURIA: Primary | ICD-10-CM

## 2020-04-23 PROCEDURE — 1160F RVW MEDS BY RX/DR IN RCRD: CPT | Performed by: FAMILY MEDICINE

## 2020-04-23 PROCEDURE — 99214 OFFICE O/P EST MOD 30 MIN: CPT | Performed by: FAMILY MEDICINE

## 2020-04-23 RX ORDER — LOSARTAN POTASSIUM 25 MG/1
25 TABLET ORAL DAILY
COMMUNITY
Start: 2020-04-22 | End: 2021-04-22

## 2020-04-23 RX ORDER — SULFAMETHOXAZOLE AND TRIMETHOPRIM 800; 160 MG/1; MG/1
2 TABLET ORAL EVERY 12 HOURS SCHEDULED
Qty: 20 TABLET | Refills: 0 | Status: SHIPPED | OUTPATIENT
Start: 2020-04-23 | End: 2020-04-28

## 2020-04-23 RX ORDER — PHENAZOPYRIDINE HYDROCHLORIDE 200 MG/1
200 TABLET, FILM COATED ORAL
Qty: 10 TABLET | Refills: 0
Start: 2020-04-23

## 2020-04-23 RX ORDER — CARVEDILOL 6.25 MG/1
6.25 TABLET ORAL 2 TIMES DAILY WITH MEALS
COMMUNITY
Start: 2020-04-16 | End: 2021-12-03

## 2020-10-27 RX ORDER — SACUBITRIL AND VALSARTAN 49; 51 MG/1; MG/1
1 TABLET, FILM COATED ORAL 2 TIMES DAILY
COMMUNITY
Start: 2020-09-02 | End: 2021-05-04 | Stop reason: DRUGHIGH

## 2020-10-29 ENCOUNTER — IMMUNIZATIONS (OUTPATIENT)
Dept: FAMILY MEDICINE CLINIC | Facility: CLINIC | Age: 70
End: 2020-10-29
Payer: COMMERCIAL

## 2020-10-29 DIAGNOSIS — Z23 ENCOUNTER FOR IMMUNIZATION: ICD-10-CM

## 2020-10-29 PROCEDURE — 90662 IIV NO PRSV INCREASED AG IM: CPT

## 2020-10-29 PROCEDURE — G0008 ADMIN INFLUENZA VIRUS VAC: HCPCS

## 2021-03-02 DIAGNOSIS — Z23 ENCOUNTER FOR IMMUNIZATION: ICD-10-CM

## 2021-03-04 ENCOUNTER — IMMUNIZATIONS (OUTPATIENT)
Dept: FAMILY MEDICINE CLINIC | Facility: HOSPITAL | Age: 71
End: 2021-03-04

## 2021-03-04 DIAGNOSIS — Z23 ENCOUNTER FOR IMMUNIZATION: Primary | ICD-10-CM

## 2021-03-04 PROCEDURE — 91300 SARS-COV-2 / COVID-19 MRNA VACCINE (PFIZER-BIONTECH) 30 MCG: CPT

## 2021-03-04 PROCEDURE — 0001A SARS-COV-2 / COVID-19 MRNA VACCINE (PFIZER-BIONTECH) 30 MCG: CPT

## 2021-03-26 ENCOUNTER — IMMUNIZATIONS (OUTPATIENT)
Dept: FAMILY MEDICINE CLINIC | Facility: HOSPITAL | Age: 71
End: 2021-03-26

## 2021-03-26 DIAGNOSIS — Z23 ENCOUNTER FOR IMMUNIZATION: Primary | ICD-10-CM

## 2021-03-26 PROCEDURE — 91300 SARS-COV-2 / COVID-19 MRNA VACCINE (PFIZER-BIONTECH) 30 MCG: CPT

## 2021-03-26 PROCEDURE — 0002A SARS-COV-2 / COVID-19 MRNA VACCINE (PFIZER-BIONTECH) 30 MCG: CPT

## 2021-05-17 ENCOUNTER — TELEMEDICINE (OUTPATIENT)
Dept: FAMILY MEDICINE CLINIC | Facility: CLINIC | Age: 71
End: 2021-05-17
Payer: COMMERCIAL

## 2021-05-17 VITALS — WEIGHT: 170 LBS | BODY MASS INDEX: 27.32 KG/M2 | HEIGHT: 66 IN
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
## 2021-05-17 DIAGNOSIS — S00.86XA INSECT BITE OF FACE WITH LOCAL REACTION, INITIAL ENCOUNTER: Primary | ICD-10-CM

## 2021-05-17 DIAGNOSIS — I42.7 CHEMOTHERAPY INDUCED CARDIOMYOPATHY (HCC): ICD-10-CM

## 2021-05-17 DIAGNOSIS — J30.1 ALLERGIC RHINITIS DUE TO POLLEN, UNSPECIFIED SEASONALITY: ICD-10-CM

## 2021-05-17 DIAGNOSIS — T45.1X5A CHEMOTHERAPY INDUCED CARDIOMYOPATHY (HCC): ICD-10-CM

## 2021-05-17 PROCEDURE — 1036F TOBACCO NON-USER: CPT | Performed by: FAMILY MEDICINE

## 2021-05-17 PROCEDURE — 3008F BODY MASS INDEX DOCD: CPT | Performed by: FAMILY MEDICINE

## 2021-05-17 PROCEDURE — 99214 OFFICE O/P EST MOD 30 MIN: CPT | Performed by: FAMILY MEDICINE

## 2021-05-17 PROCEDURE — 3288F FALL RISK ASSESSMENT DOCD: CPT | Performed by: FAMILY MEDICINE

## 2021-05-17 PROCEDURE — 1160F RVW MEDS BY RX/DR IN RCRD: CPT | Performed by: FAMILY MEDICINE

## 2021-05-17 PROCEDURE — 1101F PT FALLS ASSESS-DOCD LE1/YR: CPT | Performed by: FAMILY MEDICINE

## 2021-05-17 RX ORDER — METHYLPREDNISOLONE 4 MG/1
TABLET ORAL
Qty: 21 EACH | Refills: 0 | Status: SHIPPED | OUTPATIENT
Start: 2021-05-17 | End: 2021-10-29 | Stop reason: ALTCHOICE

## 2021-05-17 NOTE — PROGRESS NOTES
Virtual Regular Visit      Assessment/Plan:    Problem List Items Addressed This Visit        Respiratory    Allergic rhinitis due to pollen     Hold loratadine while using Zyrtec  Relevant Medications    methylPREDNISolone 4 MG tablet therapy pack       Cardiovascular and Mediastinum    Chemotherapy induced cardiomyopathy ([address] )     Continue current therapy as directed by Cardiology  Other    Insect bite of face with local reaction - Primary     The patient is a 64-year-old female with history of cardiomyopathy induced by chemotherapy related to breast cancer who presents today with upper facial swelling which appears to be related to an insect bite  Patient has no symptoms of anaphylaxis presently  It appears to represent a large local reaction  She is going to use cool compresses  We are going to ask her to  some cetirizine 10 mg to take daily   Additionally will give her Medrol Dosepak to start  She is asked call with report of her condition in the morning  She will call or seek more urgent medical attention as needed in the meantime  She is in agreement with this plan  Relevant Medications    methylPREDNISolone 4 MG tablet therapy pack          BMI Counseling: Body mass index is 27 86 kg/m²  The BMI is above normal  Nutrition recommendations include decreasing portion sizes, encouraging healthy choices of fruits and vegetables, consuming healthier snacks, limiting drinks that contain sugar and moderation in carbohydrate intake  Exercise recommendations include moderate physical activity 150 minutes/week and exercising 3-5 times per week  No pharmacotherapy was ordered  Reason for visit is   Chief Complaint   Patient presents with    Insect Bite     forehead, swelling down into both eyes, itchy until she took benadryl   bmi f/u plan needed    Virtual Regular Visit        Encounter provider David Newman MD    Provider located at [address] [place] [address] [address] [id number]      Recent Visits  No visits were found meeting these conditions  Showing recent visits within past 7 days and meeting all other requirements     Today's Visits  Date Type Provider Dept   05/17/21 Telemedicine MD Woodrow Guzman   Showing today's visits and meeting all other requirements     Future Appointments  No visits were found meeting these conditions  Showing future appointments within next 150 days and meeting all other requirements        The patient was identified by name and date of birth  Louie Aschoff was informed that this is a telemedicine visit and that the visit is being conducted through [facility] and patient was informed that this is a secure, HIPAA-compliant platform  She agrees to proceed     My office door was closed  No one else was in the room  She acknowledged consent and understanding of privacy and security of the video platform  The patient has agreed to participate and understands they can discontinue the visit at any time  Patient is aware this is a billable service  Subjective  Louie Aschoff is a 66 y o  female    No see em bite about 11 am mid forehead  HPI   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, 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 with concerns over facial swelling after an insect bite  She was working outside at about 11:00 a m  when she was bitten by a gnat or noseeum  She developed some pretty rapid swelling of her forehead and periorbital region down to her maxilla  She denies any difficulty swallowing or dysphonia  No urticaria  No wheezing or shortness of breath  No lightheadedness or dizziness  She is compliant with her Coreg and Entresto for her chemotherapy-induced cardiomyopathy  Also loratadine for her allergic rhinitis      Past Medical History:   Diagnosis Date    Acute cystitis     Lymphedema     Osteopenia     Postoperative anemia due to acute blood loss 12/7/2017 Past Surgical History:   Procedure Laterality Date    BREAST SURGERY  2006    HIP SURGERY Left 11/12/2017       Current Outpatient Medications   Medication Sig Dispense Refill    ALPRAZolam (XANAX) 0 25 mg tablet Take 1 tablet (0 25 mg total) by mouth 3 (three) times a day as needed for anxiety 90 tablet 1    aspirin 81 MG tablet Take by mouth      CALCIUM CITRATE-VITAMIN D3 PO Take by mouth      carvedilol (COREG) 6 25 mg tablet Take 6 25 mg by mouth 2 (two) times a day with meals       Entresto  MG TABS 2 (two) times a day       meclizine (ANTIVERT) 25 mg tablet Take 1 tablet by mouth 3 (three) times a day as needed      Melatonin 5 MG TABS Take 5 mg by mouth      naproxen sodium (ALEVE) 220 MG tablet Take 220 mg by mouth as needed        Vitamin D, Ergocalciferol, 2000 units CAPS Take by mouth daily      loratadine (CLARITIN) 10 mg tablet Take 10 mg by mouth daily      losartan (COZAAR) 25 mg tablet Take 25 mg by mouth daily      methylPREDNISolone 4 MG tablet therapy pack Use as directed on package 21 each 0    phenazopyridine (PYRIDIUM) 200 mg tablet Take 1 tablet (200 mg total) by mouth 3 (three) times a day with meals (Patient not taking: Reported on 5/17/2021) 10 tablet 0     No current facility-administered medications for this visit  Allergies   Allergen Reactions    Contrast [Iodinated Diagnostic Agents] Shortness Of Breath     Flushed    Pollen Extract Allergic Rhinitis       Review of Systems   Constitutional: Negative  HENT: Negative  Respiratory: Negative  Cardiovascular: Negative  Skin:        As noted in the HPI   Neurological: Negative  Video Exam    Vitals:    05/17/21 1519   Weight: 77 1 kg (170 lb)   Height: 5' 5 5" (1 664 m)       Physical Exam  Constitutional:       Appearance: She is not ill-appearing  HENT:      Head:      Comments: She has edema of her forehead as well as periorbital region down to her maxilla    There is some palpebral swelling but she can open her eyes  There does not appear to be significant erythema or induration  Pulmonary:      Effort: Pulmonary effort is normal  No respiratory distress  Neurological:      Mental Status: She is alert and oriented to person, place, and time  Psychiatric:         Mood and Affect: Mood normal          Thought Content: Thought content normal          Judgment: Judgment normal           I spent 15 minutes directly with the patient during this visit      VIRTUAL VISIT Denise Huynh U  36  acknowledges that she has consented to an online visit or consultation  She understands that the online visit is based solely on information provided by her, and that, in the absence of a face-to-face physical evaluation by the physician, the diagnosis she receives is both limited and provisional in terms of accuracy and completeness  This is not intended to replace a full medical face-to-face evaluation by the physician  Estrella Du understands and accepts these terms

## 2021-05-17 NOTE — ASSESSMENT & PLAN NOTE
The patient is a 58-year-old female with history of cardiomyopathy induced by chemotherapy related to breast cancer who presents today with upper facial swelling which appears to be related to an insect bite  Patient has no symptoms of anaphylaxis presently  It appears to represent a large local reaction  She is going to use cool compresses  We are going to ask her to  some cetirizine 10 mg to take daily   Additionally will give her Medrol Dosepak to start  She is asked call with report of her condition in the morning  She will call or seek more urgent medical attention as needed in the meantime  She is in agreement with this plan

## 2021-10-29 ENCOUNTER — OFFICE VISIT (OUTPATIENT)
Dept: FAMILY MEDICINE CLINIC | Facility: CLINIC | Age: 71
End: 2021-10-29
Payer: COMMERCIAL

## 2021-10-29 ENCOUNTER — TELEPHONE (OUTPATIENT)
Dept: ADMINISTRATIVE | Facility: OTHER | Age: 71
End: 2021-10-29

## 2021-10-29 VITALS
HEART RATE: 56 BPM | WEIGHT: 172 LBS | TEMPERATURE: 98.2 F | SYSTOLIC BLOOD PRESSURE: 115 MMHG | HEIGHT: 66 IN | DIASTOLIC BLOOD PRESSURE: 70 MMHG | OXYGEN SATURATION: 98 % | BODY MASS INDEX: 27.64 KG/M2

## 2021-10-29 DIAGNOSIS — Z23 ENCOUNTER FOR IMMUNIZATION: ICD-10-CM

## 2021-10-29 DIAGNOSIS — I42.7 CHEMOTHERAPY INDUCED CARDIOMYOPATHY (HCC): ICD-10-CM

## 2021-10-29 DIAGNOSIS — M85.852 OSTEOPENIA OF BOTH HIPS: ICD-10-CM

## 2021-10-29 DIAGNOSIS — C50.919 MALIGNANT NEOPLASM OF FEMALE BREAST, UNSPECIFIED ESTROGEN RECEPTOR STATUS, UNSPECIFIED LATERALITY, UNSPECIFIED SITE OF BREAST (HCC): ICD-10-CM

## 2021-10-29 DIAGNOSIS — R32 URINARY INCONTINENCE, UNSPECIFIED TYPE: Primary | ICD-10-CM

## 2021-10-29 DIAGNOSIS — T45.1X5A CHEMOTHERAPY INDUCED CARDIOMYOPATHY (HCC): ICD-10-CM

## 2021-10-29 DIAGNOSIS — F32.1 CURRENT MODERATE EPISODE OF MAJOR DEPRESSIVE DISORDER WITHOUT PRIOR EPISODE (HCC): ICD-10-CM

## 2021-10-29 DIAGNOSIS — M85.851 OSTEOPENIA OF BOTH HIPS: ICD-10-CM

## 2021-10-29 DIAGNOSIS — S16.1XXA CERVICAL MYOFASCIAL STRAIN, INITIAL ENCOUNTER: ICD-10-CM

## 2021-10-29 DIAGNOSIS — I42.0 CARDIOMYOPATHY, DILATED (HCC): ICD-10-CM
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
## 2021-10-29 PROBLEM — S00.86XA INSECT BITE OF FACE WITH LOCAL REACTION: Status: RESOLVED | Noted: 2021-05-17 | Resolved: 2021-10-29

## 2021-10-29 LAB
SL AMB  POCT GLUCOSE, UA: ABNORMAL
SL AMB LEUKOCYTE ESTERASE,UA: ABNORMAL
SL AMB POCT BILIRUBIN,UA: ABNORMAL
SL AMB POCT BLOOD,UA: ABNORMAL
SL AMB POCT CLARITY,UA: CLEAR
SL AMB POCT COLOR,UA: YELLOW
SL AMB POCT KETONES,UA: ABNORMAL
SL AMB POCT NITRITE,UA: ABNORMAL
SL AMB POCT PH,UA: 5.5
SL AMB POCT SPECIFIC GRAVITY,UA: 1.02
SL AMB POCT URINE PROTEIN: ABNORMAL
SL AMB POCT UROBILINOGEN: 0.2

## 2021-10-29 PROCEDURE — G0008 ADMIN INFLUENZA VIRUS VAC: HCPCS

## 2021-10-29 PROCEDURE — 90662 IIV NO PRSV INCREASED AG IM: CPT

## 2021-10-29 PROCEDURE — 81003 URINALYSIS AUTO W/O SCOPE: CPT | Performed by: FAMILY MEDICINE

## 2021-10-29 PROCEDURE — 99214 OFFICE O/P EST MOD 30 MIN: CPT | Performed by: FAMILY MEDICINE

## 2021-10-29 RX ORDER — CHOLECALCIFEROL (VITAMIN D3) 125 MCG
5 CAPSULE ORAL
COMMUNITY
Start: 2021-10-21 | End: 2022-10-21

## 2021-10-29 RX ORDER — MIRABEGRON 25 MG/1
25 TABLET, FILM COATED, EXTENDED RELEASE ORAL DAILY
Qty: 30 TABLET | Refills: 5 | Status: SHIPPED | OUTPATIENT
Start: 2021-10-29 | End: 2022-04-28 | Stop reason: SDUPTHER

## 2021-10-29 RX ORDER — METHOCARBAMOL 750 MG/1
750 TABLET, FILM COATED ORAL EVERY 6 HOURS PRN
Qty: 40 TABLET | Refills: 0 | Status: SHIPPED | OUTPATIENT
Start: 2021-10-29

## 2021-12-03 ENCOUNTER — OFFICE VISIT (OUTPATIENT)
Dept: FAMILY MEDICINE CLINIC | Facility: CLINIC | Age: 71
End: 2021-12-03
Payer: COMMERCIAL

## 2021-12-03 VITALS
BODY MASS INDEX: 27.64 KG/M2 | HEART RATE: 62 BPM | DIASTOLIC BLOOD PRESSURE: 62 MMHG | TEMPERATURE: 98.9 F | HEIGHT: 66 IN | OXYGEN SATURATION: 96 % | SYSTOLIC BLOOD PRESSURE: 110 MMHG | WEIGHT: 172 LBS

## 2021-12-03 DIAGNOSIS — B34.9 VIRAL INFECTION, UNSPECIFIED: ICD-10-CM

## 2021-12-03 DIAGNOSIS — Z00.00 MEDICARE ANNUAL WELLNESS VISIT, INITIAL: Primary | ICD-10-CM

## 2021-12-03 DIAGNOSIS — R32 URINARY INCONTINENCE, UNSPECIFIED TYPE: ICD-10-CM

## 2021-12-03 PROBLEM — N30.00 ACUTE CYSTITIS WITHOUT HEMATURIA: Status: RESOLVED | Noted: 2020-04-18 | Resolved: 2021-12-03

## 2021-12-03 PROCEDURE — 99214 OFFICE O/P EST MOD 30 MIN: CPT | Performed by: FAMILY MEDICINE

## 2021-12-03 PROCEDURE — 1036F TOBACCO NON-USER: CPT | Performed by: FAMILY MEDICINE

## 2021-12-03 PROCEDURE — 3725F SCREEN DEPRESSION PERFORMED: CPT | Performed by: FAMILY MEDICINE

## 2021-12-03 PROCEDURE — G0438 PPPS, INITIAL VISIT: HCPCS | Performed by: FAMILY MEDICINE

## 2021-12-03 PROCEDURE — 3008F BODY MASS INDEX DOCD: CPT | Performed by: FAMILY MEDICINE

## 2021-12-03 PROCEDURE — 1170F FXNL STATUS ASSESSED: CPT | Performed by: FAMILY MEDICINE

## 2021-12-03 PROCEDURE — 1125F AMNT PAIN NOTED PAIN PRSNT: CPT | Performed by: FAMILY MEDICINE

## 2021-12-03 PROCEDURE — 0241U HB NFCT DS VIR RESP RNA 4 TRGT: CPT | Performed by: FAMILY MEDICINE

## 2021-12-03 PROCEDURE — 1160F RVW MEDS BY RX/DR IN RCRD: CPT | Performed by: FAMILY MEDICINE

## 2021-12-03 PROCEDURE — 3288F FALL RISK ASSESSMENT DOCD: CPT | Performed by: FAMILY MEDICINE

## 2021-12-03 RX ORDER — FLUOROURACIL 50 MG/G
CREAM TOPICAL AS NEEDED
COMMUNITY
Start: 2021-11-03

## 2021-12-04 LAB
FLUAV RNA RESP QL NAA+PROBE: NEGATIVE
FLUBV RNA RESP QL NAA+PROBE: NEGATIVE
RSV RNA RESP QL NAA+PROBE: NEGATIVE
SARS-COV-2 RNA RESP QL NAA+PROBE: NEGATIVE

## 2022-04-28 DIAGNOSIS — R32 URINARY INCONTINENCE, UNSPECIFIED TYPE: ICD-10-CM

## 2022-04-28 RX ORDER — MIRABEGRON 25 MG/1
25 TABLET, FILM COATED, EXTENDED RELEASE ORAL DAILY
Qty: 30 TABLET | Refills: 5 | Status: SHIPPED | OUTPATIENT
Start: 2022-04-28

## 2022-08-05 DIAGNOSIS — N39.0 URINARY TRACT INFECTION WITHOUT HEMATURIA, SITE UNSPECIFIED: Primary | ICD-10-CM

## 2022-08-05 DIAGNOSIS — I89.0 LYMPHEDEMA: Primary | ICD-10-CM

## 2022-08-05 RX ORDER — DICLOXACILLIN SODIUM 250 MG/1
250 CAPSULE ORAL EVERY 6 HOURS SCHEDULED
Qty: 40 CAPSULE | Refills: 0 | Status: SHIPPED | OUTPATIENT
Start: 2022-08-05 | End: 2022-08-15

## 2022-08-05 RX ORDER — SULFAMETHOXAZOLE AND TRIMETHOPRIM 800; 160 MG/1; MG/1
2 TABLET ORAL EVERY 12 HOURS SCHEDULED
Qty: 20 TABLET | Refills: 0 | Status: SHIPPED | OUTPATIENT
Start: 2022-08-05 | End: 2022-08-10

## 2022-10-12 PROBLEM — Z00.00 MEDICARE ANNUAL WELLNESS VISIT, INITIAL: Status: RESOLVED | Noted: 2021-12-03 | Resolved: 2022-10-12

## 2022-10-26 ENCOUNTER — VBI (OUTPATIENT)
Dept: ADMINISTRATIVE | Facility: OTHER | Age: 72
End: 2022-10-26